# Patient Record
Sex: MALE | Race: BLACK OR AFRICAN AMERICAN | NOT HISPANIC OR LATINO | URBAN - METROPOLITAN AREA
[De-identification: names, ages, dates, MRNs, and addresses within clinical notes are randomized per-mention and may not be internally consistent; named-entity substitution may affect disease eponyms.]

---

## 2019-12-25 ENCOUNTER — EMERGENCY (EMERGENCY)
Facility: HOSPITAL | Age: 24
LOS: 0 days | Discharge: HOME | End: 2019-12-25
Admitting: EMERGENCY MEDICINE
Payer: COMMERCIAL

## 2019-12-25 VITALS
HEART RATE: 96 BPM | TEMPERATURE: 98 F | RESPIRATION RATE: 18 BRPM | DIASTOLIC BLOOD PRESSURE: 83 MMHG | WEIGHT: 220.02 LBS | SYSTOLIC BLOOD PRESSURE: 137 MMHG | OXYGEN SATURATION: 100 %

## 2019-12-25 DIAGNOSIS — Z77.21 CONTACT WITH AND (SUSPECTED) EXPOSURE TO POTENTIALLY HAZARDOUS BODY FLUIDS: ICD-10-CM
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
## 2019-12-25 PROCEDURE — 99282 EMERGENCY DEPT VISIT SF MDM: CPT
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
## 2019-12-25 NOTE — ED PROVIDER NOTE - PHYSICAL EXAMINATION
CONSTITUTIONAL: Well-appearing; well-nourished; in no apparent distress.   EYES: PERRL; EOM intact.   SKIN: Normal for age and race; warm; dry; good turgor; no apparent lesions or exudate.   NEURO/PSYCH: A & O x 4; grossly unremarkable. mood and manner are appropriate. Grooming and personal hygiene are appropriate. No apparent thoughts of harm to self or others.

## 2019-12-25 NOTE — ED PROVIDER NOTE - PATIENT PORTAL LINK FT
You can access the FollowMyHealth Patient Portal offered by [facility] by registering at the following website: [email or web address]. By joining [employer]’s FollowMyHealth portal, you will also be able to view your health information using other applications (apps) compatible with our system.
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
## 2019-12-25 NOTE — ED PROVIDER NOTE - NSFOLLOWUPCLINICS_GEN_ALL_ED_FT
[facility]  Infectious Disease  [address] [place] [id number]  Phone: [phone]  Fax:   Follow Up Time:

## 2022-08-31 NOTE — ED ADULT TRIAGE NOTE - SPO2 (%)
Appeal paperwork completed and mailed as requested along with a letter, chart note and trial information.    100

## 2023-04-23 PROBLEM — Z00.00 ENCOUNTER FOR PREVENTIVE HEALTH EXAMINATION: Status: ACTIVE | Noted: 2023-04-23

## 2023-04-24 ENCOUNTER — APPOINTMENT (OUTPATIENT)
Dept: SURGERY | Facility: CLINIC | Age: 28
End: 2023-04-24
Payer: COMMERCIAL

## 2023-04-24 VITALS
DIASTOLIC BLOOD PRESSURE: 82 MMHG | SYSTOLIC BLOOD PRESSURE: 130 MMHG | HEART RATE: 74 BPM | BODY MASS INDEX: 31.69 KG/M2 | HEIGHT: 72 IN | WEIGHT: 234 LBS

## 2023-04-24 DIAGNOSIS — R73.03 PREDIABETES.: ICD-10-CM

## 2023-04-24 DIAGNOSIS — Z78.9 OTHER SPECIFIED HEALTH STATUS: ICD-10-CM

## 2023-04-24 PROCEDURE — 99203 OFFICE O/P NEW LOW 30 MIN: CPT

## 2023-04-24 NOTE — HISTORY OF PRESENT ILLNESS
[de-identified] : CASSIA KING is a 28 year old M who is referred to the office for consultation visit, by PCP, Dr. Ramos. Patient presents w mass of the left, posterior neck. He had an US and MRI done of the neck. Patient states the lump is increased in size and is very bothersome. \par

## 2023-04-24 NOTE — PHYSICAL EXAM
[No Rash or Lesion] : No rash or lesion [Alert] : alert [Oriented to Person] : oriented to person [Oriented to Place] : oriented to place [Oriented to Time] : oriented to time [Calm] : calm [de-identified] : A/Ox3; NAD. appears comfortable [de-identified] : EOMI; sclera anicteric. [de-identified] : large palpable mass, left posterolateral neck, approx 5 x 8 x 15 cm in size  [de-identified] : airway patent, no use of accessory muscles [de-identified] : abd is soft, NT/ND\par

## 2023-04-24 NOTE — CONSULT LETTER
[Dear  ___] : Dear  [unfilled], [Consult Letter:] : I had the pleasure of evaluating your patient, [unfilled]. [Consult Closing:] : Thank you very much for allowing me to participate in the care of this patient.  If you have any questions, please do not hesitate to contact me. [Sincerely,] : Sincerely, [FreeTextEntry3] : Ghassan Horta MD\par

## 2023-04-24 NOTE — ASSESSMENT
[FreeTextEntry1] : Mr. KING is a 28 year y/o M who presents w large left posterolateral neck mass, measuring approx 5 x 8 x 15 cm in size. Patient sates the lump is bothersome and increased in size.

## 2023-04-24 NOTE — PLAN
[FreeTextEntry1] : Mr. CASSIA KING  was informed of significance of findings. All options, risks and benefits were discussed at length. Informed consent for excision of, left posterolateral neck mass; and potential risks, benefits and alternatives (surgical options were discussed including non-surgical options or the option of no surgery) to the planned surgery were discussed in depth. All surgical options were discussed including non-surgical treatments. The patient wishes to proceed with surgery. We will plan for surgery on at the next available date, pending any required insurance pre-certification or pre-approval. He agrees to obtain any necessary pre-operative evaluations and testing prior to surgery.\par Patient advised to seek immediate medical attention with any acute change in symptoms or with the development of any new or worsening symptoms. Patient's questions and concerns addressed to patient's satisfaction, and patient verbalized an understanding of the information discussed.\par \par As per patient, lab work was done within the past 30 days.

## 2023-04-26 ENCOUNTER — OUTPATIENT (OUTPATIENT)
Dept: OUTPATIENT SERVICES | Facility: HOSPITAL | Age: 28
LOS: 1 days | End: 2023-04-26
Payer: COMMERCIAL

## 2023-04-26 ENCOUNTER — NON-APPOINTMENT (OUTPATIENT)
Age: 28
End: 2023-04-26

## 2023-04-26 VITALS
HEART RATE: 79 BPM | DIASTOLIC BLOOD PRESSURE: 82 MMHG | TEMPERATURE: 98 F | RESPIRATION RATE: 16 BRPM | WEIGHT: 233.91 LBS | HEIGHT: 72 IN | SYSTOLIC BLOOD PRESSURE: 140 MMHG | OXYGEN SATURATION: 97 %

## 2023-04-26 DIAGNOSIS — Z01.818 ENCOUNTER FOR OTHER PREPROCEDURAL EXAMINATION: ICD-10-CM

## 2023-04-26 DIAGNOSIS — R22.1 LOCALIZED SWELLING, MASS AND LUMP, NECK: ICD-10-CM

## 2023-04-26 PROCEDURE — G0463: CPT

## 2023-04-26 NOTE — H&P PST ADULT - NSANTHOSAYNRD_GEN_A_CORE
No. AVRIL screening performed.  STOP BANG Legend: 0-2 = LOW Risk; 3-4 = INTERMEDIATE Risk; 5-8 = HIGH Risk

## 2023-04-26 NOTE — H&P PST ADULT - NSICDXPROCEDURE_GEN_ALL_CORE_FT
PROCEDURES:  Excision, soft tissue tumor, neck, subfascial, 5 cm or greater 26-Apr-2023 08:10:54  Hermila Rodriguez

## 2023-04-26 NOTE — H&P PST ADULT - PROBLEM SELECTOR PLAN 1
Pt schedule for Left Posterolateral Neck Mass Excision on 4/28/23 with Dr Horta     Labs drawn in PCP 3/13/23- results in chart   Pt was seen by PCP for Medical clearance 4/7/23-will f/u with report     Pt was  instructed to stop aspirin/ecotrin and all over the counter medication including vitamins and herbal supplements one week prior to surgery   Instructions given on the use of 4% chlorhexidine wash and Pt verbalized understanding of same   Pt Instructed to have nothing by mouth starting midnight day before surgery  Patient is to expect a phone call day before surgery between the hours of 430- 630pm giving arrival time for surgery   Written and verbal preoperative instructions given to patient with understanding verbalized.     Patient today with STOP bang score 2  Low  risk for AVRIL Pt schedule for Left Posterolateral Neck Mass Excision on 4/28/23 with Dr Horta     Labs drawn in PCP 3/13/23- results in chart     Pt was  instructed to stop aspirin/ecotrin and all over the counter medication including vitamins and herbal supplements one week prior to surgery   Instructions given on the use of 4% chlorhexidine wash and Pt verbalized understanding of same   Pt Instructed to have nothing by mouth starting midnight day before surgery  Patient is to expect a phone call day before surgery between the hours of 430- 630pm giving arrival time for surgery   Written and verbal preoperative instructions given to patient with understanding verbalized.     Patient today with STOP bang score 2  Low  risk for AVRIL

## 2023-04-26 NOTE — H&P PST ADULT - HISTORY OF PRESENT ILLNESS
28 y.o male with no significant PMHx c/o of Mass on the Left Neck and  now presents for Presurgical evaluation for schedule Left Posterolateral Neck Mass Excision on 4/28/23 with Dr Horta

## 2023-04-26 NOTE — H&P PST ADULT - ASSESSMENT
28 y.o male with Left Neck Mass and now for schedule Left Posterolateral Neck Mass Excision on 4/28/23 with Dr Mychal VERAS 2

## 2023-04-28 ENCOUNTER — APPOINTMENT (OUTPATIENT)
Dept: SURGERY | Facility: HOSPITAL | Age: 28
End: 2023-04-28

## 2023-05-01 ENCOUNTER — APPOINTMENT (OUTPATIENT)
Dept: SURGICAL ONCOLOGY | Facility: CLINIC | Age: 28
End: 2023-05-01
Payer: COMMERCIAL

## 2023-05-01 ENCOUNTER — NON-APPOINTMENT (OUTPATIENT)
Age: 28
End: 2023-05-01

## 2023-05-01 VITALS
HEART RATE: 90 BPM | WEIGHT: 234 LBS | SYSTOLIC BLOOD PRESSURE: 156 MMHG | RESPIRATION RATE: 15 BRPM | DIASTOLIC BLOOD PRESSURE: 83 MMHG | HEIGHT: 72 IN | OXYGEN SATURATION: 97 % | BODY MASS INDEX: 31.69 KG/M2

## 2023-05-01 DIAGNOSIS — Z87.891 PERSONAL HISTORY OF NICOTINE DEPENDENCE: ICD-10-CM

## 2023-05-01 DIAGNOSIS — Z80.6 FAMILY HISTORY OF LEUKEMIA: ICD-10-CM

## 2023-05-01 PROBLEM — R22.1 LOCALIZED SWELLING, MASS AND LUMP, NECK: Chronic | Status: ACTIVE | Noted: 2023-04-26

## 2023-05-01 PROCEDURE — 99205 OFFICE O/P NEW HI 60 MIN: CPT

## 2023-05-01 NOTE — HISTORY OF PRESENT ILLNESS
[de-identified] : Mr. CASSIA KING is a 28 year old man, referred by Dr. Ghassan Horta, here today for an initial consultation of neck mass. \par \par Reports mass to left side of neck that has been increasing in size and bothersome . he initially noticed it in left side of neck last year, slowly increased over time. denies any pain in ext, edema, difficulty swelling or breathing. 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

## 2023-05-01 NOTE — ASSESSMENT
[FreeTextEntry1] : IMP:\par 29yo M w/ neck mass since last year, F/U MRI soft tissue/neck on 4/12/2023 showed a large 5.4 x 8.1 x 15.8 cm well-circumscribed hypervascular lipomatous neoplasm within the posterior musculature of the left neck & upper chest (from C3 to T3 levels) most consistent with lipoma, no neck adenopathy\par \par \par PLAN:\par -Wiide excision of the neck mass\par  -All risks and benefits of procedure including but nor limited to injury to surrounding tissue, seroma, nerve injury, lymphedema, wound infectious were discussed with patient who agreed with procedure\par \par \par

## 2023-05-01 NOTE — CONSULT LETTER
[Consult Letter:] : I had the pleasure of evaluating your patient, [unfilled]. [Please see my note below.] : Please see my note below. [Consult Closing:] : Thank you very much for allowing me to participate in the care of this patient.  If you have any questions, please do not hesitate to contact me. [Sincerely,] : Sincerely, [FreeTextEntry2] : Mychal Ferrell MD [FreeTextEntry1] : I will keep you informed of the final pathology. [FreeTextEntry3] : Sarkis Thomposn MD FACS\par Chief of Surgical Oncology\par \par  [DrRobb  ___] : Dr. HOLLAND

## 2023-05-01 NOTE — PHYSICAL EXAM
[Normal] : not distended, non tender, no masses, no hepatosplenomegaly [Normal Neck Lymph Nodes] : normal neck lymph nodes  [Normal Supraclavicular Lymph Nodes] : normal supraclavicular lymph nodes [de-identified] : 15 cm  mass in left posterior site of neck, extending to the back, mobile, intramascular behind SCM

## 2023-05-02 ENCOUNTER — OUTPATIENT (OUTPATIENT)
Dept: OUTPATIENT SERVICES | Facility: HOSPITAL | Age: 28
LOS: 1 days | End: 2023-05-02

## 2023-05-02 VITALS
HEART RATE: 88 BPM | DIASTOLIC BLOOD PRESSURE: 88 MMHG | SYSTOLIC BLOOD PRESSURE: 144 MMHG | OXYGEN SATURATION: 97 % | HEIGHT: 71 IN | TEMPERATURE: 99 F | RESPIRATION RATE: 16 BRPM | WEIGHT: 229.94 LBS

## 2023-05-02 DIAGNOSIS — R22.1 LOCALIZED SWELLING, MASS AND LUMP, NECK: ICD-10-CM

## 2023-05-02 LAB
A1C WITH ESTIMATED AVERAGE GLUCOSE RESULT: 5.9 % — HIGH (ref 4–5.6)
ANION GAP SERPL CALC-SCNC: 16 MMOL/L — HIGH (ref 7–14)
BLD GP AB SCN SERPL QL: NEGATIVE — SIGNIFICANT CHANGE UP
BUN SERPL-MCNC: 10 MG/DL — SIGNIFICANT CHANGE UP (ref 7–23)
CALCIUM SERPL-MCNC: 9.6 MG/DL — SIGNIFICANT CHANGE UP (ref 8.4–10.5)
CHLORIDE SERPL-SCNC: 104 MMOL/L — SIGNIFICANT CHANGE UP (ref 98–107)
CO2 SERPL-SCNC: 22 MMOL/L — SIGNIFICANT CHANGE UP (ref 22–31)
CREAT SERPL-MCNC: 0.94 MG/DL — SIGNIFICANT CHANGE UP (ref 0.5–1.3)
EGFR: 112 ML/MIN/1.73M2 — SIGNIFICANT CHANGE UP
ESTIMATED AVERAGE GLUCOSE: 123 — SIGNIFICANT CHANGE UP
GLUCOSE SERPL-MCNC: 105 MG/DL — HIGH (ref 70–99)
HCT VFR BLD CALC: 41.3 % — SIGNIFICANT CHANGE UP (ref 39–50)
HGB BLD-MCNC: 12.8 G/DL — LOW (ref 13–17)
MCHC RBC-ENTMCNC: 24.9 PG — LOW (ref 27–34)
MCHC RBC-ENTMCNC: 31 GM/DL — LOW (ref 32–36)
MCV RBC AUTO: 80.2 FL — SIGNIFICANT CHANGE UP (ref 80–100)
NRBC # BLD: 0 /100 WBCS — SIGNIFICANT CHANGE UP (ref 0–0)
NRBC # FLD: 0 K/UL — SIGNIFICANT CHANGE UP (ref 0–0)
PLATELET # BLD AUTO: 410 K/UL — HIGH (ref 150–400)
POTASSIUM SERPL-MCNC: 3.6 MMOL/L — SIGNIFICANT CHANGE UP (ref 3.5–5.3)
POTASSIUM SERPL-SCNC: 3.6 MMOL/L — SIGNIFICANT CHANGE UP (ref 3.5–5.3)
RBC # BLD: 5.15 M/UL — SIGNIFICANT CHANGE UP (ref 4.2–5.8)
RBC # FLD: 14.1 % — SIGNIFICANT CHANGE UP (ref 10.3–14.5)
RH IG SCN BLD-IMP: POSITIVE — SIGNIFICANT CHANGE UP
SODIUM SERPL-SCNC: 142 MMOL/L — SIGNIFICANT CHANGE UP (ref 135–145)
WBC # BLD: 9.59 K/UL — SIGNIFICANT CHANGE UP (ref 3.8–10.5)
WBC # FLD AUTO: 9.59 K/UL — SIGNIFICANT CHANGE UP (ref 3.8–10.5)

## 2023-05-02 RX ORDER — SODIUM CHLORIDE 9 MG/ML
1000 INJECTION, SOLUTION INTRAVENOUS
Refills: 0 | Status: DISCONTINUED | OUTPATIENT
Start: 2023-05-12 | End: 2023-05-12

## 2023-05-02 NOTE — H&P PST ADULT - PROBLEM SELECTOR PLAN 1
Resection of left posterior neck mass  5/12/23.    CBC BMP HgbA1C EKG    Preop instructions and antibacterial soap given and explained (verbal and written), with teach back.

## 2023-05-02 NOTE — H&P PST ADULT - HISTORY OF PRESENT ILLNESS
29 y/o male with tleft neck mass which he first noticed approx 1 year ago , and has enlarged over time.  Pt reports he was told mass is likely a lipoma. Scheduled for Resection of left posterior neck mass.  29 y/o male with left neck mass which he first noticed approx 1 year ago,  and has enlarged over time.  Pt reports he was told mass is likely a lipoma. Scheduled for Resection of left posterior neck mass.

## 2023-05-02 NOTE — H&P PST ADULT - ASSESSMENT
27 y/o male with left neck mass which he first noticed approx 1 year ago,  and has enlarged over time.  Pt reports he was told mass is likely a lipoma. Scheduled for Resection of left posterior neck mass.

## 2023-05-02 NOTE — H&P PST ADULT - NSICDXFAMILYHX_GEN_ALL_CORE_FT
FAMILY HISTORY:  Father  Still living? Yes, Estimated age: Age Unknown  Family history of diabetes mellitus (DM), Age at diagnosis: Age Unknown  FH: HTN (hypertension), Age at diagnosis: Age Unknown  No family history of diabetes mellitus, Age at diagnosis: Age Unknown    Mother  Still living? Yes, Estimated age: Age Unknown  FH: brain aneurysm, Age at diagnosis: Age Unknown  FH: hypothyroidism, Age at diagnosis: Age Unknown

## 2023-05-02 NOTE — H&P PST ADULT - SKIN/BREAST COMMENTS
left  neck mass which he first noticed approx 1 year ago , and has enlarged over time.  Pt reports he was told mass is likely a lipoma. Scheduled for Resection of left posterior neck mass.

## 2023-05-11 ENCOUNTER — TRANSCRIPTION ENCOUNTER (OUTPATIENT)
Age: 28
End: 2023-05-11

## 2023-05-11 NOTE — ASU PATIENT PROFILE, ADULT - NSICDXPASTMEDICALHX_GEN_ALL_CORE_FT
Patient discharged to home in stable conditon.  Written and verbal after care 
instructions given. 

Patient verbalizes understanding of instructions.

Pt left ER w/ steady gait. PAST MEDICAL HISTORY:  Neck mass     Prediabetes

## 2023-05-11 NOTE — ASU PATIENT PROFILE, ADULT - SUBSTANCE USE COMMENT, PROFILE
Quality 130: Documentation Of Current Medications In The Medical Record: Current Medications Documented n/a

## 2023-05-12 ENCOUNTER — OUTPATIENT (OUTPATIENT)
Dept: INPATIENT UNIT | Facility: HOSPITAL | Age: 28
LOS: 1 days | Discharge: HOME | End: 2023-05-12
Payer: COMMERCIAL

## 2023-05-12 ENCOUNTER — TRANSCRIPTION ENCOUNTER (OUTPATIENT)
Age: 28
End: 2023-05-12

## 2023-05-12 ENCOUNTER — APPOINTMENT (OUTPATIENT)
Dept: SURGICAL ONCOLOGY | Facility: HOSPITAL | Age: 28
End: 2023-05-12

## 2023-05-12 ENCOUNTER — RESULT REVIEW (OUTPATIENT)
Age: 28
End: 2023-05-12

## 2023-05-12 VITALS
DIASTOLIC BLOOD PRESSURE: 87 MMHG | SYSTOLIC BLOOD PRESSURE: 139 MMHG | WEIGHT: 229.94 LBS | RESPIRATION RATE: 15 BRPM | HEIGHT: 71 IN | OXYGEN SATURATION: 100 % | TEMPERATURE: 99 F | HEART RATE: 83 BPM

## 2023-05-12 DIAGNOSIS — R22.1 LOCALIZED SWELLING, MASS AND LUMP, NECK: ICD-10-CM

## 2023-05-12 PROCEDURE — 88307 TISSUE EXAM BY PATHOLOGIST: CPT | Mod: 26

## 2023-05-12 DEVICE — SURGICEL FIBRILLAR 2 X 4": Type: IMPLANTABLE DEVICE | Site: LEFT POSTERIOR NECK | Status: FUNCTIONAL

## 2023-05-12 DEVICE — AGENT HEMOSTATIC HEMOBLAST 1.65G 10CM: Type: IMPLANTABLE DEVICE | Site: LEFT POSTERIOR NECK | Status: FUNCTIONAL

## 2023-05-12 DEVICE — LIGATING CLIPS WECK HORIZON SMALL-WIDE (RED) 24: Type: IMPLANTABLE DEVICE | Site: LEFT POSTERIOR NECK | Status: FUNCTIONAL

## 2023-05-12 DEVICE — LIGATING CLIPS WECK HORIZON MEDIUM (BLUE) 24: Type: IMPLANTABLE DEVICE | Site: LEFT POSTERIOR NECK | Status: FUNCTIONAL

## 2023-05-12 RX ORDER — SODIUM CHLORIDE 9 MG/ML
1000 INJECTION, SOLUTION INTRAVENOUS
Refills: 0 | Status: DISCONTINUED | OUTPATIENT
Start: 2023-05-12 | End: 2023-05-12

## 2023-05-12 RX ORDER — OXYCODONE HYDROCHLORIDE 5 MG/1
5 TABLET ORAL EVERY 4 HOURS
Refills: 0 | Status: DISCONTINUED | OUTPATIENT
Start: 2023-05-12 | End: 2023-05-13

## 2023-05-12 RX ORDER — FENTANYL CITRATE 50 UG/ML
50 INJECTION INTRAVENOUS
Refills: 0 | Status: DISCONTINUED | OUTPATIENT
Start: 2023-05-12 | End: 2023-05-12

## 2023-05-12 RX ORDER — ENOXAPARIN SODIUM 100 MG/ML
40 INJECTION SUBCUTANEOUS EVERY 24 HOURS
Refills: 0 | Status: DISCONTINUED | OUTPATIENT
Start: 2023-05-13 | End: 2023-05-13

## 2023-05-12 RX ORDER — ACETAMINOPHEN 500 MG
2 TABLET ORAL
Qty: 0 | Refills: 0 | DISCHARGE
Start: 2023-05-12

## 2023-05-12 RX ORDER — ONDANSETRON 8 MG/1
4 TABLET, FILM COATED ORAL ONCE
Refills: 0 | Status: DISCONTINUED | OUTPATIENT
Start: 2023-05-12 | End: 2023-05-12

## 2023-05-12 RX ORDER — ACETAMINOPHEN 500 MG
1000 TABLET ORAL EVERY 6 HOURS
Refills: 0 | Status: DISCONTINUED | OUTPATIENT
Start: 2023-05-12 | End: 2023-05-13

## 2023-05-12 RX ORDER — HYDROMORPHONE HYDROCHLORIDE 2 MG/ML
1 INJECTION INTRAMUSCULAR; INTRAVENOUS; SUBCUTANEOUS
Refills: 0 | Status: DISCONTINUED | OUTPATIENT
Start: 2023-05-12 | End: 2023-05-12

## 2023-05-12 RX ADMIN — Medication 1000 MILLIGRAM(S): at 23:47

## 2023-05-12 RX ADMIN — Medication 1000 MILLIGRAM(S): at 23:05

## 2023-05-12 RX ADMIN — Medication 1000 MILLIGRAM(S): at 17:55

## 2023-05-12 RX ADMIN — Medication 1000 MILLIGRAM(S): at 17:07

## 2023-05-12 NOTE — DISCHARGE NOTE PROVIDER - NSDCFUADDINST_GEN_ALL_CORE_FT
WOUND CARE:  Please keep incisions clean and dry. Please do not Scrub or rub incisions. Do not use lotion or powder on incisions.   BATHING: You may shower and/or sponge bathe. You may use warm soapy water in the shower and rinse, pat dry.   PAIN: You may take over-the-counter medications for pain. You make take Tylenol orally every 6 hours as needed. Do not excessed 4,000mg a day. You may take ibuprofen every 6 hours. You may alternate between the two for better pain control. You have been prescribed narcotics for pain management. Please take as prescribed on pill bottle, AS NEEDED, for breakthrough pain ONLY.   ACTIVITY: No heavy lifting or straining. Otherwise, you may return to your usual level of physical activity. If you are taking narcotic pain medication DO NOT drive a car, operate machinery or make important decisions.  DIET: Return to your usual diet.  DRAIN: You will be discharged with a RIKKI drain.  You will need to empty it and record outputs accurately.  This was taught to you by the nursing staff prior to discharge from the hospital.  Please do not remove the RIKKI drain.  It will be removed in the office at your follow up appointment.  Please bring accurate records of output to the office at your follow up appointment.    NOTIFY YOUR SURGEON IF YOU HAVE: any bleeding that does not stop, any pus draining from your wound(s), any fever (over 100.4 F) persistent nausea/vomiting, inability to urinate, or if your pain is not controlled on your discharge pain medications.     FOLLOW UP:  1. Please follow up with your primary care physician in one week regarding your hospitalization, bring copies of your discharge paperwork.  2. Please follow up with your surgeon, Dr. Thompson 1-2 weeks after discharge. Please call the office to schedule the appointment or if you have any questions. WOUND CARE:  Please keep incisions clean and dry. Please do not Scrub or rub incisions. Do not use lotion or powder on incisions.   BATHING: You may shower and/or sponge bathe. You may use warm soapy water in the shower and rinse, pat dry.   PAIN: You may take over-the-counter medications for pain. You make take Tylenol orally every 6 hours as needed. Do not excessed 4,000mg a day. You may take ibuprofen every 6 hours. You may alternate between the two for better pain control.   ACTIVITY: No heavy lifting or straining. Otherwise, you may return to your usual level of physical activity. If you are taking narcotic pain medication DO NOT drive a car, operate machinery or make important decisions.  DIET: Return to your usual diet.  DRAIN: You will be discharged with a RIKKI drain.  You will need to empty it and record outputs accurately.  This was taught to you by the nursing staff prior to discharge from the hospital.  Please do not remove the RIKKI drain.  It will be removed in the office at your follow up appointment.  Please bring accurate records of output to the office at your follow up appointment.    NOTIFY YOUR SURGEON IF YOU HAVE: any bleeding that does not stop, any pus draining from your wound(s), any fever (over 100.4 F) persistent nausea/vomiting, inability to urinate, or if your pain is not controlled on your discharge pain medications.     FOLLOW UP:  1. Please follow up with your primary care physician in one week regarding your hospitalization, bring copies of your discharge paperwork.  2. Please follow up with your surgeon, Dr. Thompson 1-2 weeks after discharge. Please call the office to schedule the appointment or if you have any questions.

## 2023-05-12 NOTE — PATIENT PROFILE ADULT - TRANSPORTATION
[FreeTextEntry1] : more depressed,memory loss- seeing neurologist- had MRI\par no cp/sob/palpitations
no

## 2023-05-12 NOTE — DISCHARGE NOTE PROVIDER - CARE PROVIDER_API CALL
Sarkis Thompson)  Surgery  [address] [facility], [place] [address]  Phone: [phone]  Fax: [phone]  Follow Up Time: 1 week

## 2023-05-12 NOTE — BRIEF OPERATIVE NOTE - ESTIMATED BLOOD LOSS
10
Instructions (Optional): Consultation with ophthalmologist for further evaluation
Detail Level: Detailed
Introduction Text (Please End With A Colon): The following procedure was deferred:
Procedure To Be Performed At Next Visit: Biopsy by shave method

## 2023-05-12 NOTE — PATIENT PROFILE ADULT - FALL HARM RISK - UNIVERSAL INTERVENTIONS
Bed in lowest position, wheels locked, appropriate side rails in place/Call bell, personal items and telephone in reach/Instruct patient to call for assistance before getting out of bed or chair/Non-slip footwear when patient is out of bed/[place] to call system/Physically safe environment - no spills, clutter or unnecessary equipment/Purposeful Proactive Rounding/Room/bathroom lighting operational, light cord in reach

## 2023-05-12 NOTE — DISCHARGE NOTE PROVIDER - NSDCMRMEDTOKEN_GEN_ALL_CORE_FT
acetaminophen 500 mg oral tablet: 2 tab(s) orally every 6 hours  No Home Medications:    acetaminophen 500 mg oral tablet: 2 tab(s) orally every 6 hours

## 2023-05-12 NOTE — BRIEF OPERATIVE NOTE - OPERATION/FINDINGS
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 XI nerve was identified and preserved . drain was placed. Good hemostasis

## 2023-05-12 NOTE — CHART NOTE - NSCHARTNOTEFT_GEN_A_CORE
Surgery Post op Note    Procedure: excision of left neck angiolipoma    SUBJECTIVE: Patient seen and evaluated at the bedside. Feeling well without complaints.     Objective:   Vital Signs Last 24 Hrs  T(C): 36.9 (12 May 2023 11:55), Max: 37.1 (12 May 2023 05:58)  T(F): 98.4 (12 May 2023 11:55), Max: 98.8 (12 May 2023 05:58)  HR: 77 (12 May 2023 11:55) (77 - 91)  BP: 144/77 (12 May 2023 11:55) (115/67 - 144/77)  BP(mean): 83 (12 May 2023 11:15) (75 - 83)  RR: 17 (12 May 2023 11:55) (12 - 17)  SpO2: 99% (12 May 2023 11:55) (95% - 100%)    Parameters below as of 12 May 2023 11:55  Patient On (Oxygen Delivery Method): room air      I&O's Summary    12 May 2023 07:01  -  12 May 2023 14:54  --------------------------------------------------------  IN: 590 mL / OUT: 627 mL / NET: -37 mL      I&O's Detail    12 May 2023 07:01  -  12 May 2023 14:54  --------------------------------------------------------  IN:    Lactated Ringers: 50 mL    Oral Fluid: 540 mL  Total IN: 590 mL    OUT:    Bulb (mL): 27 mL    IV PiggyBack: 0 mL    Voided (mL): 600 mL  Total OUT: 627 mL    Total NET: -37 mL            LABS:                MEDICATIONS  (STANDING):  acetaminophen     Tablet .. 1000 milliGRAM(s) Oral every 6 hours    MEDICATIONS  (PRN):  oxyCODONE    IR 5 milliGRAM(s) Oral every 4 hours PRN Severe Pain (7 - 10)      Physical exam  General- no acute distress, resting  Neck- left sided surgical incision dressed without strikethrough. Drain in place with ss output  Abdomen- soft, non-tender, non-distended  Respiratory- unlabored respirations  Extremities- moving spontaneously  Neurological- no focal deficits     Assessment/Plan: 28y Male s/p excision of left mass angiolipoma   - Diet: regular  - Activity- OOB with assistance  - Labs: am   - Pain medication as needed   - DVT ppx  - incentive spirometry   - anticipate discharge tomorrow (5/13)

## 2023-05-12 NOTE — DISCHARGE NOTE PROVIDER - HOSPITAL COURSE
Patient is a 29 y/o male with no significant PMHx, found to have neck mass who presented to [facility] for scheduled procedure.     Patient is now s/p excision of angiolipoma of neck on 5/12/23. Patient tolerated operation well and there were no post-operative complications identified. Patient remained hemodynamically stable in the PACU and transferred to the surgical floor.    Diet was restarted and advanced as tolerated. Pain control was transitioned from IV to PO pain meds. Patient received education on how to manage drain when discharged from the hospital.     At this time, patient is currently ambulating, voiding, tolerating a regular diet. Pain well controlled on PO pain meds. Patient is hemodynamically stable and felt safe with being discharged. Patient understood and agreed with plan. Per Dr. Thompson, patient is stable for discharge to home with RIKKI drain with outpatient follow up within 1 week of discharge. Patient is a 27 y/o male with no significant PMHx, found to have neck mass who presented to [facility] for scheduled procedure.     Patient is now s/p excision of angiolipoma of neck on 5/12/23. Patient tolerated operation well and there were no post-operative complications identified. Patient remained hemodynamically stable in the PACU and transferred to the surgical floor.    Diet was restarted and advanced as tolerated. Pain control was transitioned from IV to PO pain meds. Patient received education on how to manage drain when discharged from the hospital.    At this time, patient is currently ambulating, voiding, tolerating a regular diet. Pain well controlled on PO pain meds. Patient is hemodynamically stable and felt safe with being discharged. Patient understood and agreed with plan. Per Dr. Thompson, patient is stable for discharge to home with RIKKI drain with outpatient follow up within 1 week of discharge.

## 2023-05-13 ENCOUNTER — TRANSCRIPTION ENCOUNTER (OUTPATIENT)
Age: 28
End: 2023-05-13

## 2023-05-13 VITALS
RESPIRATION RATE: 19 BRPM | SYSTOLIC BLOOD PRESSURE: 142 MMHG | DIASTOLIC BLOOD PRESSURE: 65 MMHG | HEART RATE: 85 BPM | OXYGEN SATURATION: 99 % | TEMPERATURE: 98 F

## 2023-05-13 LAB
ANION GAP SERPL CALC-SCNC: 16 MMOL/L — HIGH (ref 7–14)
BUN SERPL-MCNC: 15 MG/DL — SIGNIFICANT CHANGE UP (ref 7–23)
CALCIUM SERPL-MCNC: 9.4 MG/DL — SIGNIFICANT CHANGE UP (ref 8.4–10.5)
CHLORIDE SERPL-SCNC: 101 MMOL/L — SIGNIFICANT CHANGE UP (ref 98–107)
CO2 SERPL-SCNC: 22 MMOL/L — SIGNIFICANT CHANGE UP (ref 22–31)
CREAT SERPL-MCNC: 0.81 MG/DL — SIGNIFICANT CHANGE UP (ref 0.5–1.3)
EGFR: 122 ML/MIN/1.73M2 — SIGNIFICANT CHANGE UP
GLUCOSE SERPL-MCNC: 125 MG/DL — HIGH (ref 70–99)
HCT VFR BLD CALC: 41.4 % — SIGNIFICANT CHANGE UP (ref 39–50)
HGB BLD-MCNC: 13.2 G/DL — SIGNIFICANT CHANGE UP (ref 13–17)
MAGNESIUM SERPL-MCNC: 2.2 MG/DL — SIGNIFICANT CHANGE UP (ref 1.6–2.6)
MCHC RBC-ENTMCNC: 25.3 PG — LOW (ref 27–34)
MCHC RBC-ENTMCNC: 31.9 GM/DL — LOW (ref 32–36)
MCV RBC AUTO: 79.5 FL — LOW (ref 80–100)
NRBC # BLD: 0 /100 WBCS — SIGNIFICANT CHANGE UP (ref 0–0)
NRBC # FLD: 0 K/UL — SIGNIFICANT CHANGE UP (ref 0–0)
PHOSPHATE SERPL-MCNC: 3.8 MG/DL — SIGNIFICANT CHANGE UP (ref 2.5–4.5)
PLATELET # BLD AUTO: 417 K/UL — HIGH (ref 150–400)
POTASSIUM SERPL-MCNC: 4 MMOL/L — SIGNIFICANT CHANGE UP (ref 3.5–5.3)
POTASSIUM SERPL-SCNC: 4 MMOL/L — SIGNIFICANT CHANGE UP (ref 3.5–5.3)
RBC # BLD: 5.21 M/UL — SIGNIFICANT CHANGE UP (ref 4.2–5.8)
RBC # FLD: 14.2 % — SIGNIFICANT CHANGE UP (ref 10.3–14.5)
SODIUM SERPL-SCNC: 139 MMOL/L — SIGNIFICANT CHANGE UP (ref 135–145)
WBC # BLD: 14.87 K/UL — HIGH (ref 3.8–10.5)
WBC # FLD AUTO: 14.87 K/UL — HIGH (ref 3.8–10.5)

## 2023-05-13 RX ADMIN — Medication 1000 MILLIGRAM(S): at 12:41

## 2023-05-13 RX ADMIN — Medication 1000 MILLIGRAM(S): at 13:20

## 2023-05-13 RX ADMIN — ENOXAPARIN SODIUM 40 MILLIGRAM(S): 100 INJECTION SUBCUTANEOUS at 04:29

## 2023-05-13 RX ADMIN — Medication 1000 MILLIGRAM(S): at 04:29

## 2023-05-13 RX ADMIN — Medication 1000 MILLIGRAM(S): at 04:30

## 2023-05-13 NOTE — DISCHARGE NOTE NURSING/CASE MANAGEMENT/SOCIAL WORK - PATIENT PORTAL LINK FT
You can access the FollowMyHealth Patient Portal offered by [facility] by registering at the following website: [email or web address]. By joining [facility]’s FollowMyHealth portal, you will also be able to view your health information using other applications (apps) compatible with our system.

## 2023-05-13 NOTE — DISCHARGE NOTE NURSING/CASE MANAGEMENT/SOCIAL WORK - NSDCPEFALRISK_GEN_ALL_CORE
For information on Fall & Injury Prevention, visit: [email or web address] OR  [email or web address] OR  https://www.cdc.gov/steadi/patient.html

## 2023-05-13 NOTE — PROGRESS NOTE ADULT - SUBJECTIVE AND OBJECTIVE BOX
Surgery Progress Note    INTERVAL EVENTS:   No acute events overnight.    SUBJECTIVE: Patient seen and examined at bedside with surgical team.     OBJECTIVE:    Vital Signs Last 24 Hrs  T(C): 36.9 (13 May 2023 08:25), Max: 37.2 (12 May 2023 16:21)  T(F): 98.4 (13 May 2023 08:25), Max: 99 (12 May 2023 16:21)  HR: 85 (13 May 2023 08:25) (83 - 93)  BP: 142/65 (13 May 2023 08:25) (131/62 - 142/65)  BP(mean): --  RR: 19 (13 May 2023 08:25) (18 - 19)  SpO2: 99% (13 May 2023 08:25) (97% - 99%)    Parameters below as of 13 May 2023 08:25  Patient On (Oxygen Delivery Method): room air    I&O's Detail    12 May 2023 07:01  -  13 May 2023 07:00  --------------------------------------------------------  IN:    Lactated Ringers: 50 mL    Oral Fluid: 1450 mL  Total IN: 1500 mL    OUT:    Bulb (mL): 67 mL    IV PiggyBack: 0 mL    Voided (mL): 2200 mL  Total OUT: 2267 mL    Total NET: -767 mL      13 May 2023 07:01  -  13 May 2023 12:11  --------------------------------------------------------  IN:    Oral Fluid: 480 mL  Total IN: 480 mL    OUT:    Bulb (mL): 5 mL    IV PiggyBack: 0 mL    Voided (mL): 400 mL  Total OUT: 405 mL    Total NET: 75 mL      MEDICATIONS  (STANDING):  acetaminophen     Tablet .. 1000 milliGRAM(s) Oral every 6 hours  enoxaparin Injectable 40 milliGRAM(s) SubCutaneous every 24 hours    MEDICATIONS  (PRN):  oxyCODONE    IR 5 milliGRAM(s) Oral every 4 hours PRN Severe Pain (7 - 10)      PHYSICAL EXAM:  Constitutional: NAD  Respiratory: Unlabored breathing  Abdomen: Soft, nondistended, NTTP. No rebound or guarding.  Extremities: WWP, SANABRIA spontaneously    LABS:                        13.2   14.87 )-----------( 417      ( 13 May 2023 06:30 )             41.4     05-13    139  |  101  |  15  ----------------------------<  125<H>  4.0   |  22  |  0.81    Ca    9.4      13 May 2023 06:30  Phos  3.8     05-13  Mg     2.20     05-13                IMAGING:

## 2023-05-13 NOTE — PROGRESS NOTE ADULT - ASSESSMENT
28y Male s/p excision of left mass angiolipoma   - Diet: regular  - Activity- OOB with assistance  - Labs: am   - Pain medication as needed   - DVT ppx  - incentive spirometry   - Discharge    [id number]

## 2023-05-17 PROBLEM — R73.03 PREDIABETES: Chronic | Status: ACTIVE | Noted: 2023-05-02

## 2023-05-18 ENCOUNTER — APPOINTMENT (OUTPATIENT)
Dept: SURGICAL ONCOLOGY | Facility: CLINIC | Age: 28
End: 2023-05-18
Payer: COMMERCIAL

## 2023-05-18 VITALS — DIASTOLIC BLOOD PRESSURE: 85 MMHG | SYSTOLIC BLOOD PRESSURE: 134 MMHG | HEART RATE: 93 BPM | OXYGEN SATURATION: 97 %

## 2023-05-18 PROCEDURE — 99214 OFFICE O/P EST MOD 30 MIN: CPT

## 2023-05-18 NOTE — HISTORY OF PRESENT ILLNESS
[de-identified] : Mr. CASSIA KING is a 28 year old man, referred by Dr. Ghassan Horta, here today for an post op visit \par \par Reports mass to left side of neck that has been increasing in size and bothersome . he initially noticed it in left side of neck last year, slowly increased over time. denies any pain in ext, edema, difficulty swelling or breathing. No pain associated with pain \par \par U/S neck 3/13/2023 showed a left neck nonspecific mass, measuring 8.2 x 7.8 cm\par \par F/U MRI soft tissue/neck on 4/12/2023 showed a large 5.4 x 8.1 x 15.8 cm well-circumscribed hypervascular lipomatous neoplasm within the posterior musculature of the left neck & upper check (from C3 to T3 levels) most consistent with angiolipoma, no neck LAD \par \par **** surgery***\par  S/P excision of angiolipoma of neck on 5/12/23: pending final path

## 2023-05-18 NOTE — ASSESSMENT
[FreeTextEntry1] : IMP:\par 29yo M w/ neck mass since last year, F/U MRI soft tissue/neck on 4/12/2023 showed a large 5.4 x 8.1 x 15.8 cm well-circumscribed hypervascular lipomatous neoplasm within the posterior musculature of the left neck & upper chest (from C3 to T3 levels) most consistent with lipoma, no neck adenopathy\par \par s/p excision of angiolipoma of neck on 5/12/23 pending final path \par chaitanya drain removed; tolerated well \par \par PLAN:\par check pathology\par RTO 3 weeks \par \par \par

## 2023-05-19 LAB — SURGICAL PATHOLOGY STUDY: SIGNIFICANT CHANGE UP

## 2023-06-08 ENCOUNTER — APPOINTMENT (OUTPATIENT)
Dept: SURGICAL ONCOLOGY | Facility: CLINIC | Age: 28
End: 2023-06-08
Payer: COMMERCIAL

## 2023-06-15 ENCOUNTER — APPOINTMENT (OUTPATIENT)
Dept: SURGICAL ONCOLOGY | Facility: CLINIC | Age: 28
End: 2023-06-15
Payer: COMMERCIAL

## 2023-06-15 VITALS
BODY MASS INDEX: 31.42 KG/M2 | HEIGHT: 72 IN | WEIGHT: 232 LBS | DIASTOLIC BLOOD PRESSURE: 77 MMHG | OXYGEN SATURATION: 98 % | HEART RATE: 98 BPM | RESPIRATION RATE: 15 BRPM | SYSTOLIC BLOOD PRESSURE: 130 MMHG

## 2023-06-15 DIAGNOSIS — R22.1 LOCALIZED SWELLING, MASS AND LUMP, NECK: ICD-10-CM

## 2023-06-15 PROCEDURE — 99024 POSTOP FOLLOW-UP VISIT: CPT

## 2023-06-15 NOTE — ASSESSMENT
[FreeTextEntry1] : IMP:\par 27yo M w/ neck mass since last year, F/U MRI soft tissue/neck on 4/12/2023 showed a large 5.4 x 8.1 x 15.8 cm well-circumscribed hypervascular lipomatous neoplasm within the posterior musculature of the left neck & upper chest (from C3 to T3 levels) most consistent with lipoma, no neck adenopathy\par \par  S/P excision of mass of neck on 5/12/23: path shows intramuscular hibernoma \par \par PLAN:\par RTO  PRN \par \par

## 2023-06-15 NOTE — HISTORY OF PRESENT ILLNESS
[de-identified] : Mr. CASSIA KING is a 28 year old man, referred by Dr. Ghassan Horta, here today for an post op visit \par \par Reports mass to left side of neck that has been increasing in size and bothersome . he initially noticed it in left side of neck last year, slowly increased over time. denies any pain in ext, edema, difficulty swelling or breathing. No pain associated with pain \par \par U/S neck 3/13/2023 showed a left neck nonspecific mass, measuring 8.2 x 7.8 cm\par \par F/U MRI soft tissue/neck on 4/12/2023 showed a large 5.4 x 8.1 x 15.8 cm well-circumscribed hypervascular lipomatous neoplasm within the posterior musculature of the left neck & upper check (from C3 to T3 levels) most consistent with angiolipoma, no neck LAD \par \par **** surgery***\par  S/P excision of mass of neck on 5/12/23: path shows intramuscular hibernoma

## 2023-06-15 NOTE — CONSULT LETTER
[Dear  ___] : Dear  [unfilled], [Courtesy Letter:] : I had the pleasure of seeing your patient, [unfilled], in my office today. [Please see my note below.] : Please see my note below. [Consult Closing:] : Thank you very much for allowing me to participate in the care of this patient.  If you have any questions, please do not hesitate to contact me. [Sincerely,] : Sincerely, [FreeTextEntry1] : He is doing well. [FreeTextEntry3] : Sarkis Thompson MD FACS\par Chief of Surgical Oncology\par \par  [DrRobb  ___] : Dr. HOLLAND

## 2023-09-07 NOTE — H&P PST ADULT - CIGARETTES, NUMBER OF YRS
Patient here for follow-up. Plan is for patient to undergo an excision of abdominal wall mass on 10/04. Will have combo sx with Dr. Segura for mesh placement. Will follow-up post-op   4

## 2023-10-26 NOTE — ED ADULT TRIAGE NOTE - PRO INTERPRETER NEED 2
English Orbicularis Oris Muscle Flap Text: The defect edges were debeveled with a #15 scalpel blade.  Given that the defect affected the competency of the oral sphincter an orbicularis oris muscle flap was deemed most appropriate to restore this competency and normal muscle function.  Using a sterile surgical marker, an appropriate flap was drawn incorporating the defect. The area thus outlined was incised with a #15 scalpel blade.

## 2023-10-29 NOTE — ASU PATIENT PROFILE, ADULT - NS PRO PT RIGHT SUPPORT PERSON
Problem: At Risk for Falls  Goal: # Patient does not fall  Outcome: Outcome Met, Continue evaluating goal progress toward completion  Goal: # Takes action to control fall-related risks  Outcome: Outcome Met, Continue evaluating goal progress toward completion  Goal: # Verbalizes understanding of fall risk/precautions  Description: Document education using the patient education activity  Outcome: Outcome Met, Continue evaluating goal progress toward completion     Problem: Pain  Goal: #Acceptable pain level achieved/maintained at rest using NRS/Faces  Description: This goal is used for patients who can self-report.  Acceptable means the level is at or below the identified comfort/function goal.  Outcome: Outcome Met, Continue evaluating goal progress toward completion  Goal: # Acceptable pain level achieved/maintained at rest using NRS/Faces without oversedation (opioid naive or PCA/Epidural infusion)  Description: This goal is used if Opioid-naïve or on PCA/Epidural Infusion.  Outcome: Outcome Met, Continue evaluating goal progress toward completion  Goal: # Acceptable pain level achieved/maintained with activity using NRS/Faces  Description: This goal is used for patients who can self-report and are not achieving acceptable pain control during activity.  Outcome: Outcome Met, Continue evaluating goal progress toward completion  Goal: Acceptable pain/comfort level is achieved/maintained at rest (based on Pain Behaviors Scale)  Description: This goal is used for patients who are not able to self-report pain and are assessed for pain using the Pain Behaviors Scale  Outcome: Outcome Met, Continue evaluating goal progress toward completion     Problem: Pressure Injury, Risk for  Goal: # Skin remains intact  Outcome: Outcome Met, Continue evaluating goal progress toward completion  Goal: No new pressure injury (PI) development  Outcome: Outcome Met, Continue evaluating goal progress toward completion  Goal: #  Verbalizes understanding of PI risk factors and prevention strategies  Description: Document education using the patient education activity.   Outcome: Outcome Met, Continue evaluating goal progress toward completion  Goal: Comfort optimized with pressure injury prevention strategies guided by patient/family preference. (Hospice)  Outcome: Outcome Met, Continue evaluating goal progress toward completion      Declines

## 2023-11-14 NOTE — ASU PREOP CHECKLIST - ORDERS/MEDICATION ADMINISTRATION RECORD ON CHART
done Rotation Flap Text: The defect edges were debeveled with a #15 scalpel blade. Given the location of the defect, shape of the defect and the proximity to free margins a rotation flap was deemed most appropriate. Using a sterile surgical marker, an appropriate rotation flap was drawn incorporating the defect and placing the expected incisions within the relaxed skin tension lines where possible. The area thus outlined was incised deep to adipose tissue with a #15 scalpel blade. The skin margins were undermined to an appropriate distance in all directions utilizing iris scissors. Following this, the designed flap was carried over into the primary defect and sutured into place.

## 2025-04-25 NOTE — H&P PST ADULT - PSYCHIATRIC
Price (Do Not Change): 0.00 Instructions: This plan will send the code FBSD to the PM system.  DO NOT or CHANGE the price. Detail Level: Simple negative normal/alert and oriented x3

## (undated) DEVICE — POSITIONER FOAM EGG CRATE ULNAR 2PCS (PINK)

## (undated) DEVICE — DRAIN JACKSON PRATT 10MM FLAT FULL NO TROCAR

## (undated) DEVICE — BLADE SCALPEL SAFETYLOCK #10

## (undated) DEVICE — GLV 8.5 PROTEXIS (WHITE)

## (undated) DEVICE — MARKING PEN W RULER

## (undated) DEVICE — DRSG TEGADERM 6"X8"

## (undated) DEVICE — GLV 8 PROTEXIS (WHITE)

## (undated) DEVICE — ELCTR BOVIE PENCIL SMOKE EVACUATION

## (undated) DEVICE — BLADE SCALPEL SAFETYLOCK #15

## (undated) DEVICE — DRAPE 3/4 SHEET 52X76"

## (undated) DEVICE — GLV 7 PROTEXIS (WHITE)

## (undated) DEVICE — DRAPE TOWEL BLUE 17" X 24"

## (undated) DEVICE — DRSG STERISTRIPS 0.5 X 4"

## (undated) DEVICE — DRAPE INSTRUMENT POUCH 6.75" X 11"

## (undated) DEVICE — DRSG XEROFORM 1 X 8"

## (undated) DEVICE — SOL IRR POUR NS 0.9% 500ML

## (undated) DEVICE — PACK THYROID HEAD NECK

## (undated) DEVICE — LIGASURE EXACT DISSECTOR

## (undated) DEVICE — PREP CHLORAPREP HI-LITE ORANGE 26ML

## (undated) DEVICE — DRSG OPSITE 13.75 X 4"

## (undated) DEVICE — ONETRAC LIGHTED RETRACTOR 90 X 22MM DISP

## (undated) DEVICE — SPECIMEN CONTAINER 100ML

## (undated) DEVICE — GLV 6.5 PROTEXIS (WHITE)

## (undated) DEVICE — VENODYNE/SCD SLEEVE CALF MEDIUM

## (undated) DEVICE — WARMING BLANKET LOWER ADULT

## (undated) DEVICE — GLV 7.5 PROTEXIS (WHITE)

## (undated) DEVICE — GOWN TRIMAX LG

## (undated) DEVICE — ONETRAC LIGHTED RETRACTOR 135 X 30MM DISP

## (undated) DEVICE — STAPLER SKIN VISI-STAT 35 WIDE

## (undated) DEVICE — DRSG KLING 4"

## (undated) DEVICE — MEDICATION LABELS W MARKER

## (undated) DEVICE — FOLEY TRAY 16FR 5CC LTX UMETER CLOSED

## (undated) DEVICE — DRAPE MAYO STAND 30"

## (undated) DEVICE — DRAPE 1/2 SHEET 40X57"

## (undated) DEVICE — DRSG CURITY GAUZE SPONGE 4 X 4" 12-PLY

## (undated) DEVICE — SOL IRR POUR H2O 250ML

## (undated) DEVICE — DRAIN RESERVOIR FOR JACKSON PRATT 100CC CARDINAL